# Patient Record
Sex: MALE | Race: WHITE | Employment: FULL TIME | URBAN - METROPOLITAN AREA
[De-identification: names, ages, dates, MRNs, and addresses within clinical notes are randomized per-mention and may not be internally consistent; named-entity substitution may affect disease eponyms.]

---

## 2022-06-06 ENCOUNTER — HOSPITAL ENCOUNTER (EMERGENCY)
Age: 52
Discharge: HOME | End: 2022-06-06
Attending: STUDENT IN AN ORGANIZED HEALTH CARE EDUCATION/TRAINING PROGRAM
Payer: COMMERCIAL

## 2022-06-06 ENCOUNTER — APPOINTMENT (OUTPATIENT)
Dept: GENERAL RADIOLOGY | Age: 52
End: 2022-06-06
Payer: COMMERCIAL

## 2022-06-06 VITALS
DIASTOLIC BLOOD PRESSURE: 98 MMHG | RESPIRATION RATE: 18 BRPM | HEART RATE: 74 BPM | HEIGHT: 70 IN | SYSTOLIC BLOOD PRESSURE: 182 MMHG | BODY MASS INDEX: 27.92 KG/M2 | WEIGHT: 195 LBS | OXYGEN SATURATION: 97 % | TEMPERATURE: 98.2 F

## 2022-06-06 DIAGNOSIS — S62.617A CLOSED DISPLACED FRACTURE OF PROXIMAL PHALANX OF LEFT LITTLE FINGER, INITIAL ENCOUNTER: ICD-10-CM

## 2022-06-06 DIAGNOSIS — S62.619A CLOSED DISPLACED FRACTURE OF PROXIMAL PHALANX OF FINGER OF LEFT HAND: Primary | ICD-10-CM

## 2022-06-06 PROCEDURE — 29125 APPL SHORT ARM SPLINT STATIC: CPT

## 2022-06-06 PROCEDURE — 73130 X-RAY EXAM OF HAND: CPT

## 2022-06-06 PROCEDURE — 99283 EMERGENCY DEPT VISIT LOW MDM: CPT

## 2022-06-06 RX ORDER — ASPIRIN 81 MG/1
81 TABLET, CHEWABLE ORAL DAILY
COMMUNITY

## 2022-06-06 ASSESSMENT — PAIN SCALES - GENERAL: PAINLEVEL_OUTOF10: 2

## 2022-06-06 ASSESSMENT — ENCOUNTER SYMPTOMS
BLOOD IN STOOL: 0
SHORTNESS OF BREATH: 0
SORE THROAT: 0
BACK PAIN: 0
COUGH: 0
CONSTIPATION: 0
ABDOMINAL PAIN: 0
VOMITING: 0
RHINORRHEA: 0
NAUSEA: 0
DIARRHEA: 0

## 2022-06-06 ASSESSMENT — PAIN DESCRIPTION - PAIN TYPE: TYPE: ACUTE PAIN

## 2022-06-06 ASSESSMENT — PAIN DESCRIPTION - DESCRIPTORS: DESCRIPTORS: ACHING;DISCOMFORT;SORE

## 2022-06-06 ASSESSMENT — PAIN - FUNCTIONAL ASSESSMENT: PAIN_FUNCTIONAL_ASSESSMENT: 0-10

## 2022-06-06 ASSESSMENT — PAIN DESCRIPTION - LOCATION: LOCATION: HAND

## 2022-06-06 NOTE — ED PROVIDER NOTES
regular rhythm. Heart sounds: Normal heart sounds. No murmur heard. Pulmonary:      Effort: Pulmonary effort is normal. No respiratory distress. Breath sounds: Normal breath sounds. No stridor. Abdominal:      General: There is no distension. Palpations: Abdomen is soft. Musculoskeletal:         General: Swelling and tenderness present. No deformity. Cervical back: Normal range of motion and neck supple. Comments: Decreased range of motion of fifth digit on left hand. There is swelling and ecchymosis noted. He has good capillary refill, he is neurovascular intact. Skin:     General: Skin is warm and dry. Coloration: Skin is not jaundiced or pale. Findings: No rash. Neurological:      Mental Status: He is alert and oriented to person, place, and time. Cranial Nerves: No cranial nerve deficit. Coordination: Coordination normal.          Procedures     Joint Reduction Procedure Note  Indication: fracture  Consent: The patient provided verbal consent for this procedure. Procedure: The pre-reduction exam showed distal perfusion & neurologic function to be normal. The patient was placed in the appropriate position. Anesthesia/pain control was obtained using a digital block of the left short (pinkie) finger using 1% Lidocaine without epinephrine. Reduction of the left short (pinkie) finger PIP joint was performed by direct traction. Post reduction films were obtained and revealed satisfactory reduction. A post-reduction exam revealed distal perfusion to be normal will good capillary refill. He was still numb secondary to the block. The affected area was immobilized with an ulnar gutter splint. The patient tolerated the procedure well. Complications: None        MDM     ED Course as of 06/09/22 0116   Mon Jun 06, 2022   1210 Spoke with Arnie Bates, orthopedic PA  Discussed findings.   Recommends digital block, followed by reduction and ulnar gutter splint. [BB] ED Course User Index  [BB] DO David Otoole presents to the ED for evaluation of finger injury. Workup in the ED revealed displaced fracture. Digital block performed, reduction performed. Patient with satisfactory reduction and ulnar gutter splint placed. Patient continues to be non-toxic on re-evaluation. Findings were discussed with the patient and reasons to immediately return to the ED were articulated to them. They will follow-up with occupational health.     --------------------------------------------- PAST HISTORY ---------------------------------------------  Past Medical History:  has a past medical history of Hypertension. Past Surgical History:  has no past surgical history on file. Social History:  reports that he has been smoking cigarettes. He has been smoking about 1.00 pack per day. He has never used smokeless tobacco. He reports that he does not drink alcohol and does not use drugs. Family History: family history is not on file. The patients home medications have been reviewed. Allergies: Pcn [penicillins]    -------------------------------------------------- RESULTS -------------------------------------------------  Labs:  No results found for this visit on 06/06/22. Radiology:  XR HAND LEFT (MIN 3 VIEWS)   Final Result   Anatomically aligned fracture of the proximal phalanx of 5th finger. XR HAND LEFT (MIN 3 VIEWS)   Final Result   Acute left 5th proximal phalangeal diaphysis fracture as described.             ------------------------- NURSING NOTES AND VITALS REVIEWED ---------------------------  Date / Time Roomed:  6/6/2022  9:14 AM  ED Bed Assignment:  03/03    The nursing notes within the ED encounter and vital signs as below have been reviewed.    BP (!) 182/98   Pulse 74   Temp 98.2 °F (36.8 °C)   Resp 18   Ht 5' 10\" (1.778 m)   Wt 195 lb (88.5 kg)   SpO2 97%   BMI 27.98 kg/m²   Oxygen Saturation Interpretation: Normal      ------------------------------------------ PROGRESS NOTES ------------------------------------------  1:20 AM EDT  I have spoken with the patient and discussed todays results, in addition to providing specific details for the plan of care and counseling regarding the diagnosis and prognosis. Their questions are answered at this time and they are agreeable with the plan. I discussed at length with them reasons for immediate return here for re evaluation. They will followup with the on call occupational health by calling their office tomorrow. --------------------------------- ADDITIONAL PROVIDER NOTES ---------------------------------  At this time the patient is without objective evidence of an acute process requiring hospitalization or inpatient management. They have remained hemodynamically stable throughout their entire ED visit and are stable for discharge with outpatient follow-up. The plan has been discussed in detail and they are aware of the specific conditions for emergent return, as well as the importance of follow-up. Discharge Medication List as of 6/6/2022  2:02 PM          Diagnosis:  1. Closed displaced fracture of proximal phalanx of finger of left hand    2. Closed displaced fracture of proximal phalanx of left little finger, initial encounter        Disposition:  Patient's disposition: Discharge to home  Patient's condition is stable. Please note that the above documentation was prepared using voice recognition software. Every attempt was made to ensure accuracy but there may be spelling, grammatical, and contextual errors.            Terrell Hand DO  06/09/22 0121

## 2022-06-06 NOTE — ED NOTES
The pt is awake, alert, and oriented. Sitting up right in the chair in the room versus the patient bed. He denies pain at this time. There is no monitor to reassess his vital signs. He states that the ER physician is coming back to reset his finger. He declines the ice pack that I offered him.      Katherine Page, [place]  06/06/22 4286

## 2022-06-06 NOTE — ED NOTES
Received report from [address] [place] [address] and assumed patient care.      [facility]  06/06/22 2767

## 2022-06-07 ENCOUNTER — TELEPHONE (OUTPATIENT)
Dept: ORTHOPEDIC SURGERY | Age: 52
End: 2022-06-07

## 2022-06-07 NOTE — TELEPHONE ENCOUNTER
Patient was seen at [facility] for Closed displaced fracture of proximal phalanx of finger of left hand; Closed displaced fracture of proximal phalanx of left little finger, initial encounter. He was advised to follow up with Dr Betty Holloway. He is asking to be scheduled in [place]. Please contact patient to schedule.

## 2022-06-07 NOTE — TELEPHONE ENCOUNTER
May be a Workers Comp case. ED provider has not completed their note as of 6/7/2022 3:43 PM.    XR Hand Left  Impression   Anatomically aligned fracture of the proximal phalanx of 5th finger. Routed to Providers for consideration and scheduling recommendations.

## 2022-06-08 DIAGNOSIS — T14.8XXA FRACTURE: Primary | ICD-10-CM

## 2022-06-08 NOTE — TELEPHONE ENCOUNTER
Adelfo Sherman next week unless he wants to be seen in [place] [address] next time Bill Donahue is there.

## 2022-06-08 NOTE — TELEPHONE ENCOUNTER
Call back from pt advised cannot see at [facility] office d/t no casting material, scheduled appt  [facility] office.    Future Appointments   Date Time Provider Marybel Roberts   6/14/2022  8:00  [facility]

## 2022-06-14 ENCOUNTER — OFFICE VISIT (OUTPATIENT)
Dept: ORTHOPEDIC SURGERY | Age: 52
End: 2022-06-14
Payer: COMMERCIAL

## 2022-06-14 ENCOUNTER — HOSPITAL ENCOUNTER (OUTPATIENT)
Dept: GENERAL RADIOLOGY | Age: 52
Discharge: HOME | End: 2022-06-16
Payer: COMMERCIAL

## 2022-06-14 ENCOUNTER — PREP FOR PROCEDURE (OUTPATIENT)
Dept: ORTHOPEDIC SURGERY | Age: 52
End: 2022-06-14

## 2022-06-14 ENCOUNTER — TELEPHONE (OUTPATIENT)
Dept: ORTHOPEDIC SURGERY | Age: 52
End: 2022-06-14

## 2022-06-14 VITALS — WEIGHT: 195 LBS | HEIGHT: 70 IN | BODY MASS INDEX: 27.92 KG/M2

## 2022-06-14 DIAGNOSIS — T14.8XXA FRACTURE: ICD-10-CM

## 2022-06-14 DIAGNOSIS — S62.617A DISPLACED FRACTURE OF PROXIMAL PHALANX OF LEFT LITTLE FINGER, INITIAL ENCOUNTER FOR CLOSED FRACTURE: Primary | ICD-10-CM

## 2022-06-14 PROCEDURE — 99202 OFFICE O/P NEW SF 15 MIN: CPT | Performed by: PHYSICIAN ASSISTANT

## 2022-06-14 PROCEDURE — 99203 OFFICE O/P NEW LOW 30 MIN: CPT | Performed by: PHYSICIAN ASSISTANT

## 2022-06-14 PROCEDURE — 99202 OFFICE O/P NEW SF 15 MIN: CPT

## 2022-06-14 PROCEDURE — 73130 X-RAY EXAM OF HAND: CPT

## 2022-06-14 RX ORDER — SODIUM CHLORIDE 0.9 % (FLUSH) 0.9 %
5-40 SYRINGE (ML) INJECTION PRN
Status: CANCELLED | OUTPATIENT
Start: 2022-06-14

## 2022-06-14 RX ORDER — SODIUM CHLORIDE 9 MG/ML
INJECTION, SOLUTION INTRAVENOUS PRN
Status: CANCELLED | OUTPATIENT
Start: 2022-06-14

## 2022-06-14 RX ORDER — SODIUM CHLORIDE 0.9 % (FLUSH) 0.9 %
5-40 SYRINGE (ML) INJECTION EVERY 12 HOURS SCHEDULED
Status: CANCELLED | OUTPATIENT
Start: 2022-06-14

## 2022-06-14 NOTE — H&P (VIEW-ONLY)
Orthopaedic H&P Note    Jonn Read is a 47 y.o. male, his YOB: 1970 with the following history as recorded in [employer]: There are no problems to display for this patient. Current Outpatient Medications   Medication Sig Dispense Refill    aspirin 81 MG chewable tablet Take 81 mg by mouth daily      valsartan-hydrochlorothiazide (DIOVAN-HCT) 320-12.5 MG per tablet Take 1 tablet by mouth daily       No current facility-administered medications for this visit. Allergies: Pcn [penicillins]  Past Medical History:   Diagnosis Date    Hypertension      No past surgical history on file. No family history on file. Social History     Tobacco Use    Smoking status: Current Every Day Smoker     Packs/day: 1.00     Types: Cigarettes    Smokeless tobacco: Never Used   Substance Use Topics    Alcohol use: No                             Chief Complaint   Patient presents with    ED Follow-up     LT 5th finger fx, due to work injury. Patient presents w/o splint that was placed in ED. NO complaints of pain at this time. SUBJECTIVE: Jonn Read is a 67-year-old left-hand-dominant male who is an ED follow-up for a left hand injury. He states that on 6/6/2022 he injured his left little finger at work when he cranked up the engine and the wheel started getting loose, he reached out to grab the handle and hit the outside of his fifth digit of his left hand. He noticed immediate pain and deformity of the pinky finger. X-rays in the ED showed a transverse fracture through the left fifth proximal phalangeal diaphysis with anterior and lateral angulation of the fracture. The fracture was reduced in the ED with postreduction films showing improved alignment of the fracture. He was placed into a splint and sent here for follow-up. He states he is really not having any pain and the swelling has decreased since the initial injury. Denies numbness, tingling or paresthesias. He is a smoker.   No other orthopedic complaints at this time. Review of Systems   Constitutional: Negative for fever, chills, diaphoresis, appetite change and fatigue. HENT: Negative for dental issues, hearing loss and tinnitus. Negative for congestion, sinus pressure, sneezing, sore throat. Negative for headache. Eyes: Negative for visual disturbance, blurred and double vision. Negative for pain, discharge, redness and itching  Respiratory: Negative for cough, shortness of breath and wheezing. Cardiovascular: Negative for chest pain, palpitations and leg swelling. No dyspnea on exertion   Gastrointestinal:   Negative for nausea, vomiting, abdominal pain, diarrhea, constipation  or black or bloody. Hematologic\Lymphatic:  negative for bleeding, petechiae,   Genitourinary: Negative for hematuria and difficulty urinating. Musculoskeletal: Negative for neck pain and stiffness. Mild for back pain, negative joint swelling and gait problem. Skin: Negative for pallor, rash and wound. Neurological: Negative for dizziness, tremors, seizures, weakness, light-headedness, no TIA or stroke symptoms. No numbness and headaches. Psychiatric/Behavioral: Negative.      Physical Examination:   General appearance: alert, well appearing, and in no distress,  normal appearing weight  Mental status: alert, oriented to person, place, and time, normal mood, behavior, speech, dress, motor activity, and thought processes  Abdomen: soft, nondistended   Resp:   resp easy and unlabored, no audible wheezes note  Cardiac: distal pulses palpable, skin well perfused  Neurological: alert, oriented X3, normal speech, no focal findings or movement disorder noted, motor and sensory grossly normal bilaterally, normal muscle tone, no tremors, strength 5/5, normal gait and station  HEENT: normochephalic atraumatic, external ears and eyes normal, sclera normal, neck supple  Extremities:   peripheral pulses normal, no edema, redness or tenderness in the calves Skin: normal coloration, no rashes or open wounds, no suspicious skin lesions noted  Psych: Affect euthymic   Musculoskeletal:    Extremity:  Left Upper Extremity  Skin is clean dry and intact  Moderate edema noted in the little finger  Radial pulse palpable, fingers warm with BCR  Flex/extension intact to wrist, thumb and fingers  Finger opposition intact  Finger adduction/abduction intact  Finger crossover intact  Subjectively states sensation intact to radial/medial/ulnar distribution  He has limited motion with flexion/extension of the pinky finger at the MP joint. He cannot flex/extend the pinky finger at the PIP and DIP joints due to stiffness and swelling. There is some rotational deformity at the little finger when he tries to make a fist.  He wiggles all of his other fingers without difficulty. Ht 5' 10\" (1.778 m)   Wt 195 lb (88.5 kg)   BMI 27.98 kg/m²      XR: 6/14/22   3 views left hand demonstrate transverse fracture through the left fifth proximal phalangeal diaphysis with anterior and lateral angulation of the fracture apex. ASSESSMENT:   Diagnosis Orders   1. Displaced fracture of proximal phalanx of left little finger, initial encounter for closed fracture       Discussion:  Had lengthy discussion with patient regarding His diagnosis, typical prognosis, and expected outcomes. I reviewed the possible complications from the injury itself despite treatment choosen. I also discussed treatment options including nonoperative managements versus surgical management, along with risks and benefits of each. They have elected for operative management at this time. Discussed with patient factors that can impact patient's fracture healing. Patient has the following risk factors for union: Nicotine Use    Risk, benefits and treatment options discussed with David Self.  he has verbalized understanding of options.  The possibility of complications were also discussed to include but not limited to nerve damage, infection, problems with wound healing, vascular injury, chronic pain, stiffness, dysfunction, nonhealing of the bone, symptomatic hardware and/or its failure, need for subsequent surgery, dislocation, and blood clots as well as medical related problems and other problems not specifically discussed. Risk of anesthesia also discussed to include death. Post-op care, work, activity and restrictions which included the use of pain medication and possibility of using blood thinner post op were also discussed with Leroy Taylor and he verbalized and agreed with the restrictions. PLAN:  Plan for OR on 6/17/22 with Dr. Phoenix Pierre, DO for closed reduction percutaneous pinning versus ORIF left fifth proximal phalanx fracture  Pre-surgery medical clearance is not needed  NPO after midnight the night before surgery  NWB on left upper extremity  Splint care instructed with return precautions  Hold NSAIDS 7 days prior to surgery  Hold aspirin the morning of your surgery    [facility] Patient Plan Of Care  New Orders Needing C-9 Authorization: Closed reduction percutaneous pinning versus ORIF left fifth proximal phalanx fracture  [employer]/Work Status: Patient not released to return to work at this time and will be reevaluated at next office visit  Patient Progressing: Progressing as expected  Patient candidate for Vocational Rehab at this time: No  Patient determined to be [address] [place] at this visit: No    Electronically signed by RAQUEL Cleaning on 6/14/2022 at 8:30 AM  Note: This report was completed using [employer] voiced recognition software. Every effort has been made to ensure accuracy; however, inadvertent computerized transcription errors may be present.

## 2022-06-14 NOTE — TELEPHONE ENCOUNTER
Contacted patient to advise on new surgery date. Original surgery date was 6- but Dr. Michelle Serrano changed surgery date to 6- @ 8:30 am , hospital arrival time of 6:30 am.    Patient did not answer, spoke to his wife Sakshi Tapia (home phone number belongs to her because patient does not have a cell phone). Advised wife of surgery date and time ( was not there). [facility] PAT Department phone number provided to call to go over surgery instructions.      Per wife, she prefers to be contacted after 2 to 3 pm.   Phone # [phone]

## 2022-06-14 NOTE — TELEPHONE ENCOUNTER
Prior Authorization Form:    DEMOGRAPHICS:                     Patient Name:  Nic Jauregui  Patient :  1970            Insurance:  Payor: GENERIC SELF-INSURED / Plan: Ramona Ureña WC 1500 / Product Type: *No Product type* /   Insurance ID Number:    Payor/Plan Subscr  Sex Relation Sub.  Ins. ID Effective Group Num   1. GENERIC SELF-* EDUAR YANCEY 1970 Male Self [id number]                                    [facility], [facility], [address] [place] [address], [address] [place] [address]       [] Prior authorization obtained    DIAGNOSIS & PROCEDURE:                       Procedure/Operation: Closed Reduction Percutaneous Pinning verus ORIF, Left Hand 5th Digit Proximal Phalanx           CPT Code: 40223 verus 16016    Diagnosis:  Closed Displaced Fracture of Proximal Phalanx of Left Hand 5th Digit    ICD10 Code: L57.154X    Location:  [facility]    Surgeon:  Dr. Winifred Uriarte    [facility] INFORMATION:                          Date: 2022   Time: 8:30 am               Anesthesia:  General                                                        Status:  Outpatient        Special Comments:       Vendor: [employer] [x]   Notified     Length of Surgery (with 30 min clean up time): 1.5 hours     Medical clearance: not requested    Pre-Op Labs:       []  Orders Placed    Electronically signed by Michelle Pimentel MA on 2022 at 11:43 AM

## 2022-06-14 NOTE — PROGRESS NOTES
Orthopaedic H&P Note    Jonn Read is a 47 y.o. male, his YOB: 1970 with the following history as recorded in [facility]: There are no problems to display for this patient. Current Outpatient Medications   Medication Sig Dispense Refill    aspirin 81 MG chewable tablet Take 81 mg by mouth daily      valsartan-hydrochlorothiazide (DIOVAN-HCT) 320-12.5 MG per tablet Take 1 tablet by mouth daily       No current facility-administered medications for this visit. Allergies: Pcn [penicillins]  Past Medical History:   Diagnosis Date    Hypertension      No past surgical history on file. No family history on file. Social History     Tobacco Use    Smoking status: Current Every Day Smoker     Packs/day: 1.00     Types: Cigarettes    Smokeless tobacco: Never Used   Substance Use Topics    Alcohol use: No                             Chief Complaint   Patient presents with    ED Follow-up     LT 5th finger fx, due to work injury. Patient presents w/o splint that was placed in ED. NO complaints of pain at this time. SUBJECTIVE: Jonn Read is a 58-year-old left-hand-dominant male who is an ED follow-up for a left hand injury. He states that on 6/6/2022 he injured his left little finger at work when he cranked up the engine and the wheel started getting loose, he reached out to grab the handle and hit the outside of his fifth digit of his left hand. He noticed immediate pain and deformity of the pinky finger. X-rays in the ED showed a transverse fracture through the left fifth proximal phalangeal diaphysis with anterior and lateral angulation of the fracture. The fracture was reduced in the ED with postreduction films showing improved alignment of the fracture. He was placed into a splint and sent here for follow-up. He states he is really not having any pain and the swelling has decreased since the initial injury. Denies numbness, tingling or paresthesias. He is a smoker.   No other orthopedic complaints at this time. Review of Systems   Constitutional: Negative for fever, chills, diaphoresis, appetite change and fatigue. HENT: Negative for dental issues, hearing loss and tinnitus. Negative for congestion, sinus pressure, sneezing, sore throat. Negative for headache. Eyes: Negative for visual disturbance, blurred and double vision. Negative for pain, discharge, redness and itching  Respiratory: Negative for cough, shortness of breath and wheezing. Cardiovascular: Negative for chest pain, palpitations and leg swelling. No dyspnea on exertion   Gastrointestinal:   Negative for nausea, vomiting, abdominal pain, diarrhea, constipation  or black or bloody. Hematologic\Lymphatic:  negative for bleeding, petechiae,   Genitourinary: Negative for hematuria and difficulty urinating. Musculoskeletal: Negative for neck pain and stiffness. Mild for back pain, negative joint swelling and gait problem. Skin: Negative for pallor, rash and wound. Neurological: Negative for dizziness, tremors, seizures, weakness, light-headedness, no TIA or stroke symptoms. No numbness and headaches. Psychiatric/Behavioral: Negative.      Physical Examination:   General appearance: alert, well appearing, and in no distress,  normal appearing weight  Mental status: alert, oriented to person, place, and time, normal mood, behavior, speech, dress, motor activity, and thought processes  Abdomen: soft, nondistended   Resp:   resp easy and unlabored, no audible wheezes note  Cardiac: distal pulses palpable, skin well perfused  Neurological: alert, oriented X3, normal speech, no focal findings or movement disorder noted, motor and sensory grossly normal bilaterally, normal muscle tone, no tremors, strength 5/5, normal gait and station  HEENT: normochephalic atraumatic, external ears and eyes normal, sclera normal, neck supple  Extremities:   peripheral pulses normal, no edema, redness or tenderness in the calves Skin: normal coloration, no rashes or open wounds, no suspicious skin lesions noted  Psych: Affect euthymic   Musculoskeletal:    Extremity:  Left Upper Extremity  Skin is clean dry and intact  Moderate edema noted in the little finger  Radial pulse palpable, fingers warm with BCR  Flex/extension intact to wrist, thumb and fingers  Finger opposition intact  Finger adduction/abduction intact  Finger crossover intact  Subjectively states sensation intact to radial/medial/ulnar distribution  He has limited motion with flexion/extension of the pinky finger at the MP joint. He cannot flex/extend the pinky finger at the PIP and DIP joints due to stiffness and swelling. There is some rotational deformity at the little finger when he tries to make a fist.  He wiggles all of his other fingers without difficulty. Ht 5' 10\" (1.778 m)   Wt 195 lb (88.5 kg)   BMI 27.98 kg/m²      XR: 6/14/22   3 views left hand demonstrate transverse fracture through the left fifth proximal phalangeal diaphysis with anterior and lateral angulation of the fracture apex. ASSESSMENT:   Diagnosis Orders   1. Displaced fracture of proximal phalanx of left little finger, initial encounter for closed fracture       Discussion:  Had lengthy discussion with patient regarding His diagnosis, typical prognosis, and expected outcomes. I reviewed the possible complications from the injury itself despite treatment choosen. I also discussed treatment options including nonoperative managements versus surgical management, along with risks and benefits of each. They have elected for operative management at this time. Discussed with patient factors that can impact patient's fracture healing. Patient has the following risk factors for union: Nicotine Use    Risk, benefits and treatment options discussed with Sindhu Sousa.  he has verbalized understanding of options.  The possibility of complications were also discussed to include but not limited to nerve damage, infection, problems with wound healing, vascular injury, chronic pain, stiffness, dysfunction, nonhealing of the bone, symptomatic hardware and/or its failure, need for subsequent surgery, dislocation, and blood clots as well as medical related problems and other problems not specifically discussed. Risk of anesthesia also discussed to include death. Post-op care, work, activity and restrictions which included the use of pain medication and possibility of using blood thinner post op were also discussed with Lynae Holstein and he verbalized and agreed with the restrictions. PLAN:  Plan for OR on 6/17/22 with Dr. Yonathan Dos Santos, DO for closed reduction percutaneous pinning versus ORIF left fifth proximal phalanx fracture  Pre-surgery medical clearance is not needed  NPO after midnight the night before surgery  NWB on left upper extremity  Splint care instructed with return precautions  Hold NSAIDS 7 days prior to surgery  Hold aspirin the morning of your surgery    [facility] Patient Plan Of Care  New Orders Needing C-9 Authorization: Closed reduction percutaneous pinning versus ORIF left fifth proximal phalanx fracture  [employer]/Work Status: Patient not released to return to work at this time and will be reevaluated at next office visit  Patient Progressing: Progressing as expected  Patient candidate for Vocational Rehab at this time: No  Patient determined to be [address] at this visit: No    Electronically signed by RAQUEL Menjivar on 6/14/2022 at 8:30 AM  Note: This report was completed using [employer] voiced recognition software. Every effort has been made to ensure accuracy; however, inadvertent computerized transcription errors may be present.

## 2022-06-14 NOTE — PATIENT INSTRUCTIONS
1. Your surgery is scheduled for 6/17/22 at patient will be contacted with surgical time. With Dr. Sejal Jenkins, DO at the main [id number] on On [address] of [facility] in [facility] . You will need to report to Preop area  that morning at patient will be contacted regarding what time to arrive for surgery. 2. You are having Outpatient surgery so you will be returning home the same day  3. Preadmission Testing (PAT) department at [facility] will contact you with all the details prior to surgery. 4. Nothing to eat or drink after midnight the night before surgery. You may take a pain pill and any other medicine PAT instructs you to take with small sip of water if needed. 5. Keep splint clean and dry. Do not remove or get wet. 6. Continue with ice and elevation to reduce swelling  7. No weight bearing left upper extremity, use assistive devices  8. Take pain medicine as instructed  9. Call office with any question or concerns: [phone]. Hold ASA/LOVENOX the day of surgery. Hold all NSAIDs 7 days prior to surgery    All surgical patients will be gradually tapered off narcotic pain medication post operatively, this office will not continue narcotics longer than 6 weeks from date of surgery. If narcotics are required longer than this time period without objective finding you will be referred to pain management. Open Reduction With Internal Fixation for Limb Fracture: Before Your Surgery    What is open reduction with internal fixation? Open reduction with internal fixation is a type of surgery to fix a broken (fractured) bone. The doctor makes a cut, called an incision, in the skin over the bone. The doctor then moves the pieces of bone back into the normal position. This is called open reduction. The doctor may use special screws, pins, plates, or rods to hold the bone in place while it heals. This is called internal fixation. These devices may stay in your body from now on.  The doctor closes the incision with stitches. You will have a scar, but it will fade with time. You may spend from a few hours to a few days in the hospital. This depends on how serious your injury is. It usually takes 6 to 12 weeks for a broken bone to heal.    How soon you can go back to work and your normal routine depends on your job. It also depends on how long it takes your bone to heal. For example, if you have a broken leg and you sit at work, you may be able to go back in 1 to 2 weeks. But if your job requires you to walk or stand a lot, you may need to wait until your bone has healed.

## 2022-06-15 NOTE — PROGRESS NOTES
[address]   PRE-ADMISSION TESTING GENERAL INSTRUCTIONS  PAT Phone Number: [phone]      GENERAL INSTRUCTIONS:    [x] Antibacterial Soap Shower Night before or AM of Surgery  [] Hua (CHG) wipe instruction sheet and wipes given. [] Hibiclens shower the night before and the morning of surgery.   -Do not use Hibiclens on your face or head. [x] No lotions, powders, deodorant. [x] Nothing by mouth after midnight, including gum, candy, mints, or water. [x] You may brush your teeth, gargle, but do NOT swallow water. [x] No tobacco products, illegal drugs, or alcohol within 24 hours of your surgery. [x] Jewelry or valuables should not be brought to the hospital. All body and/or tongue piercing's must be removed prior to arriving to hospital.   [x] Arrange transportation with a responsible adult  to and from the hospital. Arrange for someone to be with you for the remainder of the day and for 24 hours after your procedure due to having had anesthesia when discharged          -Who will be your  for transportation? Alicia-wife         -Who will be staying with you for 24 hrs after your procedure? Alicia-wife  [x] Bring insurance card and photo ID.  [] Bring copy of living will or healthcare power of  papers to be placed in your electronic record. [] Transfusion Bracelet: Please bring with you to hospital, day of surgery     PARKING INSTRUCTIONS:     [x] ARRIVAL TIME:  6/17 at 7:17 am   · [x] Enter into the [facility]. Two people may accompany you. Masks are required. · [x] Parking Lot \"I\" is where you will park. It is located on the corner of [address] and [address]. The entrance is on [address]. · To enter, press the button and the gate will lift. A free token will be provided to exit the lot. EDUCATION INSTRUCTIONS:        [] Knee or hip replacement booklet & exercise pamphlets given.      [x] [address] Pamphlet placed in chart. [] Pre-admission Testing educational folder given  [] Incentive Spirometry,coughing & deep breathing exercises reviewed. [] Medication information sheet(s)   [] Fluoroscopy-Xray used in surgery reviewed with patient. Educational pamphlet placed in chart. [] Pain: Post-op pain is normal and to be expected. You will be asked to rate your pain from 0-10. [] Joint camp offered. [] Joint replacement booklets given.  [] Spine Navigator to see in PAT. [] Other:___________________________    MEDICATION INSTRUCTIONS:    [x] Bring a complete list of your medications, please write the last time you took the medicine, give this list to the nurse. [x] Take the following medications the morning of surgery with 1-2 ounces of water:  Tylenol if needed   [x] Stop herbal supplements, NSAIDS and vitamins 5 days before your surgery. [] DO NOT take any diabetic medicine the morning of surgery. Follow instructions for insulin the day before surgery. [] If you are diabetic and your blood sugar is low or you feel symptomatic, you may drink 1-2 ounces of apple juice or take a glucose tablet.            -The morning of your procedure, you may call the pre-op area if you have concerns about your blood sugar [phone]. [] Use your inhalers the morning of surgery. Bring your emergency inhaler with you day of surgery. [x] Follow physician instructions regarding any blood thinners you may be taking. WHAT TO EXPECT:    [x] The day of surgery you will be greeted and checked in by the [employer].  In addition, you will be registered in the [place] by a Patient Access Representative. Please bring your photo ID and insurance card. A nurse will greet you in accordance to the time you are needed in the pre-op area to prepare you for surgery.  Please do not be discouraged if you are not greeted in the order you arrive as there are many variables that are involved in patient preparation. Your patience is greatly appreciated as you wait for your nurse. Please bring in items such as: books, magazines, newspapers, electronics, or any other items  to occupy your time in the waiting area. [x]  Delays may occur with surgery and staff will make a sincere effort to keep you informed of delays. If any delays occur with your procedure, we apologize ahead of time for your inconvenience as we recognize the value of your time.

## 2022-06-16 ENCOUNTER — ANESTHESIA EVENT (OUTPATIENT)
Dept: OPERATING ROOM | Age: 52
End: 2022-06-16
Payer: COMMERCIAL

## 2022-06-17 ENCOUNTER — ANESTHESIA (OUTPATIENT)
Dept: OPERATING ROOM | Age: 52
End: 2022-06-17
Payer: COMMERCIAL

## 2022-06-17 ENCOUNTER — APPOINTMENT (OUTPATIENT)
Dept: GENERAL RADIOLOGY | Age: 52
End: 2022-06-17
Attending: ORTHOPAEDIC SURGERY
Payer: COMMERCIAL

## 2022-06-17 ENCOUNTER — HOSPITAL ENCOUNTER (OUTPATIENT)
Age: 52
Setting detail: OUTPATIENT SURGERY
Discharge: HOME | End: 2022-06-17
Attending: ORTHOPAEDIC SURGERY | Admitting: ORTHOPAEDIC SURGERY
Payer: COMMERCIAL

## 2022-06-17 VITALS
HEIGHT: 70 IN | HEART RATE: 70 BPM | RESPIRATION RATE: 18 BRPM | DIASTOLIC BLOOD PRESSURE: 92 MMHG | WEIGHT: 195 LBS | OXYGEN SATURATION: 97 % | SYSTOLIC BLOOD PRESSURE: 158 MMHG | TEMPERATURE: 97 F | BODY MASS INDEX: 27.92 KG/M2

## 2022-06-17 DIAGNOSIS — M79.645 FINGER PAIN, LEFT: ICD-10-CM

## 2022-06-17 DIAGNOSIS — S62.617A DISPLACED FRACTURE OF PROXIMAL PHALANX OF LEFT LITTLE FINGER, INITIAL ENCOUNTER FOR CLOSED FRACTURE: Primary | ICD-10-CM

## 2022-06-17 PROCEDURE — 26727 TREAT FINGER FRACTURE EACH: CPT | Performed by: ORTHOPAEDIC SURGERY

## 2022-06-17 PROCEDURE — 3600000002 HC SURGERY LEVEL 2 BASE: Performed by: ORTHOPAEDIC SURGERY

## 2022-06-17 PROCEDURE — 7100000010 HC PHASE II RECOVERY - FIRST 15 MIN: Performed by: ORTHOPAEDIC SURGERY

## 2022-06-17 PROCEDURE — 3700000001 HC ADD 15 MINUTES (ANESTHESIA): Performed by: ORTHOPAEDIC SURGERY

## 2022-06-17 PROCEDURE — 6360000002 HC RX W HCPCS

## 2022-06-17 PROCEDURE — 7100000001 HC PACU RECOVERY - ADDTL 15 MIN: Performed by: ORTHOPAEDIC SURGERY

## 2022-06-17 PROCEDURE — 6360000002 HC RX W HCPCS: Performed by: PHYSICIAN ASSISTANT

## 2022-06-17 PROCEDURE — 3209999900 FLUORO FOR SURGICAL PROCEDURES

## 2022-06-17 PROCEDURE — 3700000000 HC ANESTHESIA ATTENDED CARE: Performed by: ORTHOPAEDIC SURGERY

## 2022-06-17 PROCEDURE — 6360000002 HC RX W HCPCS: Performed by: ANESTHESIOLOGY

## 2022-06-17 PROCEDURE — 2709999900 HC NON-CHARGEABLE SUPPLY: Performed by: ORTHOPAEDIC SURGERY

## 2022-06-17 PROCEDURE — 2580000003 HC RX 258

## 2022-06-17 PROCEDURE — 3600000012 HC SURGERY LEVEL 2 ADDTL 15MIN: Performed by: ORTHOPAEDIC SURGERY

## 2022-06-17 PROCEDURE — 7100000011 HC PHASE II RECOVERY - ADDTL 15 MIN: Performed by: ORTHOPAEDIC SURGERY

## 2022-06-17 PROCEDURE — 2500000003 HC RX 250 WO HCPCS: Performed by: ORTHOPAEDIC SURGERY

## 2022-06-17 PROCEDURE — 7100000000 HC PACU RECOVERY - FIRST 15 MIN: Performed by: ORTHOPAEDIC SURGERY

## 2022-06-17 PROCEDURE — 73120 X-RAY EXAM OF HAND: CPT

## 2022-06-17 PROCEDURE — 2580000003 HC RX 258: Performed by: PHYSICIAN ASSISTANT

## 2022-06-17 DEVICE — K WIRE FIX L6IN DIA0.045IN 1600645] MICROAIRE SURGICAL INSTRUMENTS INC]: Type: IMPLANTABLE DEVICE | Site: FINGERS | Status: FUNCTIONAL

## 2022-06-17 RX ORDER — MIDAZOLAM HYDROCHLORIDE 1 MG/ML
INJECTION INTRAMUSCULAR; INTRAVENOUS PRN
Status: DISCONTINUED | OUTPATIENT
Start: 2022-06-17 | End: 2022-06-17 | Stop reason: SDUPTHER

## 2022-06-17 RX ORDER — SODIUM CHLORIDE 0.9 % (FLUSH) 0.9 %
5-40 SYRINGE (ML) INJECTION PRN
Status: DISCONTINUED | OUTPATIENT
Start: 2022-06-17 | End: 2022-06-17 | Stop reason: HOSPADM

## 2022-06-17 RX ORDER — SODIUM CHLORIDE 9 MG/ML
INJECTION, SOLUTION INTRAVENOUS PRN
Status: DISCONTINUED | OUTPATIENT
Start: 2022-06-17 | End: 2022-06-17 | Stop reason: HOSPADM

## 2022-06-17 RX ORDER — HYDROCODONE BITARTRATE AND ACETAMINOPHEN 5; 325 MG/1; MG/1
1 TABLET ORAL EVERY 4 HOURS PRN
Qty: 42 TABLET | Refills: 0 | Status: SHIPPED | OUTPATIENT
Start: 2022-06-17 | End: 2022-06-24

## 2022-06-17 RX ORDER — HYDRALAZINE HYDROCHLORIDE 20 MG/ML
5 INJECTION INTRAMUSCULAR; INTRAVENOUS ONCE
Status: DISCONTINUED | OUTPATIENT
Start: 2022-06-17 | End: 2022-06-17 | Stop reason: CLARIF

## 2022-06-17 RX ORDER — ONDANSETRON 2 MG/ML
4 INJECTION INTRAMUSCULAR; INTRAVENOUS
Status: DISCONTINUED | OUTPATIENT
Start: 2022-06-17 | End: 2022-06-17 | Stop reason: HOSPADM

## 2022-06-17 RX ORDER — LIDOCAINE HYDROCHLORIDE 20 MG/ML
INJECTION, SOLUTION INTRAVENOUS PRN
Status: DISCONTINUED | OUTPATIENT
Start: 2022-06-17 | End: 2022-06-17 | Stop reason: SDUPTHER

## 2022-06-17 RX ORDER — DEXAMETHASONE SODIUM PHOSPHATE 10 MG/ML
INJECTION INTRAMUSCULAR; INTRAVENOUS PRN
Status: DISCONTINUED | OUTPATIENT
Start: 2022-06-17 | End: 2022-06-17 | Stop reason: SDUPTHER

## 2022-06-17 RX ORDER — SODIUM CHLORIDE 0.9 % (FLUSH) 0.9 %
5-40 SYRINGE (ML) INJECTION EVERY 12 HOURS SCHEDULED
Status: DISCONTINUED | OUTPATIENT
Start: 2022-06-17 | End: 2022-06-17 | Stop reason: HOSPADM

## 2022-06-17 RX ORDER — HYDRALAZINE HYDROCHLORIDE 20 MG/ML
5 INJECTION INTRAMUSCULAR; INTRAVENOUS ONCE
Status: COMPLETED | OUTPATIENT
Start: 2022-06-17 | End: 2022-06-17

## 2022-06-17 RX ORDER — KETOROLAC TROMETHAMINE 30 MG/ML
INJECTION, SOLUTION INTRAMUSCULAR; INTRAVENOUS PRN
Status: DISCONTINUED | OUTPATIENT
Start: 2022-06-17 | End: 2022-06-17 | Stop reason: SDUPTHER

## 2022-06-17 RX ORDER — VANCOMYCIN HYDROCHLORIDE 1 G/20ML
INJECTION, POWDER, LYOPHILIZED, FOR SOLUTION INTRAVENOUS PRN
Status: DISCONTINUED | OUTPATIENT
Start: 2022-06-17 | End: 2022-06-17 | Stop reason: SDUPTHER

## 2022-06-17 RX ORDER — FENTANYL CITRATE 50 UG/ML
INJECTION, SOLUTION INTRAMUSCULAR; INTRAVENOUS PRN
Status: DISCONTINUED | OUTPATIENT
Start: 2022-06-17 | End: 2022-06-17 | Stop reason: SDUPTHER

## 2022-06-17 RX ORDER — BUPIVACAINE HYDROCHLORIDE 5 MG/ML
INJECTION, SOLUTION EPIDURAL; INTRACAUDAL PRN
Status: DISCONTINUED | OUTPATIENT
Start: 2022-06-17 | End: 2022-06-17 | Stop reason: HOSPADM

## 2022-06-17 RX ORDER — PROPOFOL 10 MG/ML
INJECTION, EMULSION INTRAVENOUS PRN
Status: DISCONTINUED | OUTPATIENT
Start: 2022-06-17 | End: 2022-06-17 | Stop reason: SDUPTHER

## 2022-06-17 RX ORDER — ONDANSETRON 2 MG/ML
INJECTION INTRAMUSCULAR; INTRAVENOUS PRN
Status: DISCONTINUED | OUTPATIENT
Start: 2022-06-17 | End: 2022-06-17 | Stop reason: SDUPTHER

## 2022-06-17 RX ORDER — SODIUM CHLORIDE, SODIUM LACTATE, POTASSIUM CHLORIDE, CALCIUM CHLORIDE 600; 310; 30; 20 MG/100ML; MG/100ML; MG/100ML; MG/100ML
INJECTION, SOLUTION INTRAVENOUS CONTINUOUS PRN
Status: DISCONTINUED | OUTPATIENT
Start: 2022-06-17 | End: 2022-06-17 | Stop reason: SDUPTHER

## 2022-06-17 RX ADMIN — SODIUM CHLORIDE, POTASSIUM CHLORIDE, SODIUM LACTATE AND CALCIUM CHLORIDE: 600; 310; 30; 20 INJECTION, SOLUTION INTRAVENOUS at 09:23

## 2022-06-17 RX ADMIN — ONDANSETRON 4 MG: 2 INJECTION INTRAMUSCULAR; INTRAVENOUS at 09:29

## 2022-06-17 RX ADMIN — MIDAZOLAM 2 MG: 1 INJECTION INTRAMUSCULAR; INTRAVENOUS at 09:26

## 2022-06-17 RX ADMIN — SODIUM CHLORIDE: 9 INJECTION, SOLUTION INTRAVENOUS at 07:53

## 2022-06-17 RX ADMIN — FENTANYL CITRATE 100 MCG: 50 INJECTION, SOLUTION INTRAMUSCULAR; INTRAVENOUS at 09:29

## 2022-06-17 RX ADMIN — DEXAMETHASONE SODIUM PHOSPHATE 10 MG: 10 INJECTION INTRAMUSCULAR; INTRAVENOUS at 09:29

## 2022-06-17 RX ADMIN — PROPOFOL 200 MG: 10 INJECTION, EMULSION INTRAVENOUS at 09:29

## 2022-06-17 RX ADMIN — KETOROLAC TROMETHAMINE 30 MG: 60 INJECTION, SOLUTION INTRAMUSCULAR at 09:54

## 2022-06-17 RX ADMIN — VANCOMYCIN HYDROCHLORIDE 1500 MG: 10 INJECTION, POWDER, LYOPHILIZED, FOR SOLUTION INTRAVENOUS at 08:21

## 2022-06-17 RX ADMIN — HYDROMORPHONE HYDROCHLORIDE 0.5 MG: 1 INJECTION, SOLUTION INTRAMUSCULAR; INTRAVENOUS; SUBCUTANEOUS at 10:36

## 2022-06-17 RX ADMIN — LIDOCAINE HYDROCHLORIDE 100 MG: 20 INJECTION, SOLUTION INTRAVENOUS at 09:29

## 2022-06-17 RX ADMIN — HYDROMORPHONE HYDROCHLORIDE 0.5 MG: 1 INJECTION, SOLUTION INTRAMUSCULAR; INTRAVENOUS; SUBCUTANEOUS at 10:46

## 2022-06-17 RX ADMIN — HYDRALAZINE HYDROCHLORIDE 5 MG: 20 INJECTION INTRAMUSCULAR; INTRAVENOUS at 11:42

## 2022-06-17 RX ADMIN — VANCOMYCIN HYDROCHLORIDE 1500 MG: 1 INJECTION, POWDER, LYOPHILIZED, FOR SOLUTION INTRAVENOUS at 09:28

## 2022-06-17 ASSESSMENT — PAIN DESCRIPTION - ORIENTATION
ORIENTATION: LEFT

## 2022-06-17 ASSESSMENT — PAIN DESCRIPTION - DIRECTION
RADIATING_TOWARDS: FINGER
RADIATING_TOWARDS: FINGER
RADIATING_TOWARDS: FINGERS

## 2022-06-17 ASSESSMENT — PAIN DESCRIPTION - FREQUENCY
FREQUENCY: CONTINUOUS
FREQUENCY: INTERMITTENT

## 2022-06-17 ASSESSMENT — PAIN DESCRIPTION - DESCRIPTORS
DESCRIPTORS: DISCOMFORT

## 2022-06-17 ASSESSMENT — PAIN SCALES - GENERAL
PAINLEVEL_OUTOF10: 7
PAINLEVEL_OUTOF10: 0
PAINLEVEL_OUTOF10: 5
PAINLEVEL_OUTOF10: 7
PAINLEVEL_OUTOF10: 5
PAINLEVEL_OUTOF10: 9

## 2022-06-17 ASSESSMENT — PAIN - FUNCTIONAL ASSESSMENT
PAIN_FUNCTIONAL_ASSESSMENT: NONE - DENIES PAIN
PAIN_FUNCTIONAL_ASSESSMENT: ACTIVITIES ARE NOT PREVENTED

## 2022-06-17 ASSESSMENT — PAIN DESCRIPTION - PAIN TYPE
TYPE: SURGICAL PAIN

## 2022-06-17 ASSESSMENT — LIFESTYLE VARIABLES: SMOKING_STATUS: 1

## 2022-06-17 ASSESSMENT — PAIN DESCRIPTION - LOCATION
LOCATION: HAND

## 2022-06-17 ASSESSMENT — PAIN DESCRIPTION - ONSET
ONSET: PROGRESSIVE
ONSET: PROGRESSIVE
ONSET: ON-GOING
ONSET: ON-GOING

## 2022-06-17 NOTE — INTERVAL H&P NOTE
Update History & Physical    The patient's History and Physical of June 14, 2022 was reviewed with the patient and I examined the patient. There was no change. The surgical site was confirmed by the patient and me. Plan:   Left small finger proximal phalanx fracture open versus closed reduction with internal fixation    I have explained the risks and complications of the recommended surgery with the patient at length, as well as discussed potential treatment alternatives including nonoperative management. These risks include but are not limited to death or complication from anesthesia, continued pain, nerve tendon or vascular injury, infection, nonunion or malunion, symptomatic hardware or hardware failure, deep vein thrombosis or pulmonary embolism, stiffness, malrotation, unforeseen complications, and need for further surgery, etc.  Patient understood this, asked appropriate questions, which were all answered, and he has elected to proceed with the procedure.      Electronically signed by Dora Dominguez DO on 6/17/2022 at 7:53 AM

## 2022-06-17 NOTE — PROGRESS NOTES
Dr. Arcenio Goldman notified for Dr. Azucena Wick no x rays post op have been ordered. Also asking  When to resume ASA that patient takes at home.  Mandy Morrell RN  6/17/2022

## 2022-06-17 NOTE — ANESTHESIA POSTPROCEDURE EVALUATION
Department of Anesthesiology  Postprocedure Note    Patient: Jonathan Carias  MRN: [id number]  YOB: 1970  Date of evaluation: 6/17/2022  Time:  4:45 PM     Procedure Summary     Date: 06/17/22 Room / Location: [facility] 09 / [facility]    Anesthesia Start: 9206 Anesthesia Stop: 1030    Procedure: PERCUTANOUS PINNING LEFT HAND 5TH PROXIMAL PHALANX (Left Hand) Diagnosis:       Closed displaced fracture of proximal phalanx of left little finger, initial encounter      (CLOSED DISPLACED FRACTURE OF PROXIMAL PHALANX OF LEFT 5TH DIGIT)    Surgeons: Joshua Ramirez DO Responsible Provider: August Blevins DO    Anesthesia Type: general ASA Status: 2          Anesthesia Type: No value filed. Yola Phase I: Oyla Score: 10    Yola Phase II: Yola Score: 10    Last vitals: Reviewed and per EMR flowsheets.        Anesthesia Post Evaluation    Patient location during evaluation: PACU  Patient participation: complete - patient participated  Level of consciousness: awake and alert  Pain score: 1  Airway patency: patent  Nausea & Vomiting: no nausea and no vomiting  Complications: no  Cardiovascular status: hemodynamically stable  Respiratory status: acceptable  Hydration status: euvolemic

## 2022-06-17 NOTE — OP NOTE
dressed with Xeroform and a well-padded ulnar gutter splint with the hand in intrinsic plus position. Patient was in awoken uneventfully from anesthetic transfer onto the [facility] and to the postanesthesia care in stable condition. Postoperative plans:  Patient be discharged home today as this was scheduled for an outpatient procedure. Is to maintain a splint, elevation, nonweightbearing affected extremity. Patient plans to return to work 6/20/2022, discussed is not to use the left hand and to keep his splint clean dry and intact. He is status back in orthopedic office in 2 weeks for repeat evaluation. Anticipate pin removal in approximately 3-4 weeks. Electronically signed by Nena Gabriel DO on 6/17/2022     NOTE: This report was transcribed using voice recognition software.  Every effort was made to ensure accuracy; however, inadvertent computerized transcription errors may be present

## 2022-06-17 NOTE — LETTER
[address] [facility]  Phone: [phone]    No name on file. June 17, 2022     Patient: Lynae Holstein   YOB: 1970   Date of Visit: 6/14/2022       To Whom It May Concern: It is my medical opinion that Deepti Cuevas may return to work on 6/20/22 with the following restrictions: Patient is not to use the left hand for any lifting. .    If you have any questions or concerns, please don't hesitate to call. Sincerely,        No name on file.

## 2022-06-17 NOTE — PROGRESS NOTES
Dr. Tennille Gunderson notified of Dr. Domi Dietrich notifed by perfect serve he has a script to sign at PACU desk

## 2022-06-17 NOTE — ANESTHESIA PRE PROCEDURE
Department of Anesthesiology  Preprocedure Note       Name:  Dereje Engle   Age:  47 y.o.  :  1970                                          MRN:  [id number]         Date:  2022      Surgeon: Manuel Lara):  Gunner Hamlin DO    Procedure: Procedure(s):  CLOSED REDUCTION PERCUTANOUS PINNING VS OPEN REDUCTION INTERNAL FIXATION OF LEFT HAND 5TH PROXIMAL PHALANX--SYNTHES    Medications prior to admission:   Prior to Admission medications    Medication Sig Start Date End Date Taking? Authorizing Provider   Cholecalciferol (VITAMIN D3) 125 MCG (5000 UT) TABS Take by mouth daily   Yes Historical Provider, MD   aspirin 81 MG chewable tablet Take 81 mg by mouth daily    Historical Provider, MD   valsartan-hydrochlorothiazide (DIOVAN-HCT) 320-12.5 MG per tablet Take 1 tablet by mouth daily    Historical Provider, MD       Current medications:    Current Facility-Administered Medications   Medication Dose Route Frequency Provider Last Rate Last Admin    0.9 % sodium chloride infusion   IntraVENous PRN RAQUEL Pineda 50 mL/hr at 22 0753 New Bag at 22 0753    sodium chloride flush 0.9 % injection 5-40 mL  5-40 mL IntraVENous 2 times per day RAQUEL Pineda        sodium chloride flush 0.9 % injection 5-40 mL  5-40 mL IntraVENous PRN RAQUEL Pineda        vancomycin 1500 mg in dextrose 5% 300 mL IVPB  1,500 mg IntraVENous On Call to [address],  mL/hr at 22 0821 1,500 mg at 22 4118       Allergies: Allergies   Allergen Reactions    Pcn [Penicillins] Swelling       Problem List:  There is no problem list on file for this patient.       Past Medical History:        Diagnosis Date    Hypertension        Past Surgical History:        Procedure Laterality Date    TONSILLECTOMY         Social History:    Social History     Tobacco Use    Smoking status: Current Every Day Smoker     Packs/day: 1.00     Types: Cigarettes    Smokeless tobacco: Never Used   Substance Use Topics    Alcohol use: No                                Ready to quit: Not Answered  Counseling given: Not Answered      Vital Signs (Current):   Vitals:    06/15/22 1514 06/17/22 0723   BP:  (!) 184/98   Pulse:  88   Resp:  16   Temp:  36.9 °C (98.4 °F)   TempSrc:  Temporal   SpO2:  96%   Weight: 195 lb (88.5 kg) 195 lb (88.5 kg)   Height: 5' 10\" (1.778 m) 5' 10\" (1.778 m)                                              BP Readings from Last 3 Encounters:   06/17/22 (!) 184/98   06/06/22 (!) 182/98       NPO Status: Time of last liquid consumption: 2100                        Time of last solid consumption: 2100                        Date of last liquid consumption: 06/16/22                        Date of last solid food consumption: 06/16/22    BMI:   Wt Readings from Last 3 Encounters:   06/17/22 195 lb (88.5 kg)   06/14/22 195 lb (88.5 kg)   06/06/22 195 lb (88.5 kg)     Body mass index is 27.98 kg/m². CBC:   Lab Results   Component Value Date    WBC 13.2 06/15/2015    RBC 5.20 06/15/2015    HGB 14.9 06/15/2015    HCT 45.0 06/15/2015    MCV 86.5 06/15/2015    RDW 13.8 06/15/2015     06/15/2015       CMP:   Lab Results   Component Value Date     06/15/2015    K 3.7 06/15/2015     06/15/2015    CO2 27 06/15/2015    BUN 10 06/15/2015    CREATININE 0.8 06/15/2015    GFRAA >60 06/15/2015    LABGLOM >60 06/15/2015    GLUCOSE 100 06/15/2015    PROT 7.2 06/15/2015    CALCIUM 9.6 06/15/2015    BILITOT 0.3 06/15/2015    ALKPHOS 86 06/15/2015    AST 21 06/15/2015    ALT 20 06/15/2015       POC Tests: No results for input(s): POCGLU, POCNA, POCK, POCCL, POCBUN, POCHEMO, POCHCT in the last 72 hours.     Coags: No results found for: PROTIME, INR, APTT    HCG (If Applicable): No results found for: PREGTESTUR, PREGSERUM, HCG, HCGQUANT     ABGs: No results found for: PHART, PO2ART, LEE1FWQ, BRP4UTE, BEART, [id number]     Type & Screen (If Applicable):  No results found for: LABABO, [facility]    Drug/Infectious Status (If Applicable):  No results found for: HIV, HEPCAB    COVID-19 Screening (If Applicable): No results found for: COVID19        Anesthesia Evaluation  Patient summary reviewed and Nursing notes reviewed no history of anesthetic complications:   Airway: Mallampati: II  TM distance: >3 FB   Neck ROM: full  Mouth opening: > = 3 FB   Dental:      Comment: Poor dentition. Several missing. Pulmonary:Negative Pulmonary ROS breath sounds clear to auscultation  (+) current smoker          Patient smoked on day of surgery. Cardiovascular:Negative CV ROS  Exercise tolerance: good (>4 METS),   (+) hypertension: moderate,         Rhythm: regular  Rate: normal                    Neuro/Psych:   Negative Neuro/Psych ROS              GI/Hepatic/Renal: Neg GI/Hepatic/Renal ROS            Endo/Other:                      ROS comment: Displaced fracture of proximal phalanx of left little finger, initial encounter for closed fracture  Abdominal:             Vascular: negative vascular ROS. Other Findings:           Anesthesia Plan      general     ASA 2       Induction: intravenous. MIPS: Postoperative trial extubation. Anesthetic plan and risks discussed with patient and spouse. Use of blood products discussed with patient and spouse whom. Plan discussed with attending. Post-op pain plan if not by surgeon: continuous peripheral nerve block            Rik Tello RN   6/17/2022        Patient seen and examined, chart reviewed, agree with above findings. Anesthetic plan, risks, benefits, alternatives, and personnel involved discussed with patient. Patient verbalized an understanding and agreed to proceed. NPO status confirmed. Anesthetic plan discussed with care team members and agreed upon.     Reynolds Bosworth, DO   6/17/2022  9:20 AM

## 2022-06-17 NOTE — PROGRESS NOTES
Patient is refusing additional pain medication at this time. B/p is trending high and patient has not taken home hypertensive medication.

## 2022-06-29 DIAGNOSIS — S62.617A DISPLACED FRACTURE OF PROXIMAL PHALANX OF LEFT LITTLE FINGER, INITIAL ENCOUNTER FOR CLOSED FRACTURE: Primary | ICD-10-CM

## 2022-06-29 DIAGNOSIS — T14.8XXA FRACTURE: ICD-10-CM

## 2022-06-30 ENCOUNTER — HOSPITAL ENCOUNTER (OUTPATIENT)
Dept: GENERAL RADIOLOGY | Age: 52
Discharge: HOME | End: 2022-07-02
Payer: COMMERCIAL

## 2022-06-30 ENCOUNTER — OFFICE VISIT (OUTPATIENT)
Dept: ORTHOPEDIC SURGERY | Age: 52
End: 2022-06-30
Payer: COMMERCIAL

## 2022-06-30 DIAGNOSIS — T14.8XXA FRACTURE: ICD-10-CM

## 2022-06-30 DIAGNOSIS — S62.617A DISPLACED FRACTURE OF PROXIMAL PHALANX OF LEFT LITTLE FINGER, INITIAL ENCOUNTER FOR CLOSED FRACTURE: Primary | ICD-10-CM

## 2022-06-30 DIAGNOSIS — S62.617A DISPLACED FRACTURE OF PROXIMAL PHALANX OF LEFT LITTLE FINGER, INITIAL ENCOUNTER FOR CLOSED FRACTURE: ICD-10-CM

## 2022-06-30 PROCEDURE — 99212 OFFICE O/P EST SF 10 MIN: CPT

## 2022-06-30 PROCEDURE — 29125 APPL SHORT ARM SPLINT STATIC: CPT

## 2022-06-30 PROCEDURE — 99024 POSTOP FOLLOW-UP VISIT: CPT | Performed by: ORTHOPAEDIC SURGERY

## 2022-06-30 PROCEDURE — 73130 X-RAY EXAM OF HAND: CPT

## 2022-06-30 NOTE — PATIENT INSTRUCTIONS
Nonweightbearing left upper extremity. Keep ulnar gutter splint clean, dry and intact    May consider OT after his next office visit. Follow-up in 3 weeks for reevaluation, x-rays and possible pin removal.    Call if any questions or concerns.

## 2022-06-30 NOTE — PROGRESS NOTES
Patient presents today for 2wk post op LT hand 5th proximal phalanx perc pin vs ORIF 6/17/22; JVG    He states he has removed his splint a few times since surgery due to it getting wet, he did present today with splint intact. Patient states little pain, however he does experience more pinching. He did in fact return to work on 6/20.

## 2022-06-30 NOTE — PROGRESS NOTES
Chief Complaint   Patient presents with    Post-Op Check     2wk post op LT hand 5th proximal phalanx perc pin vs ORIF 6/17/22; JVG        OP:SURGEON: Dr. Justino Jones DO  DATE OF PROCEDURE: 6-17-22  PROCEDURE: PERCUTANOUS PINNING LEFT HAND 5TH PROXIMAL PHALANX     POD: 2 weeks    Subjective:  Anitra Marrero is following up from the above surgery. He is NWB on left upper extremity. He ambulates with assistive device. Pain to extremity is none and is not taking prescribed pain medication. They denies numbness or tingling to the left upper extremity. Denies calf pain, chest pain, or shortness of breath. Patient is not participating in therapy at this time. He is doing well after surgery. Complains of mild stiffness and swelling in the hand. Review of Systems -  All pertinent positives/negatives per HPI     Objective:    General: Alert and oriented X 3, normocephalic atraumatic, external ears and eye normal, sclera clear, no acute distress, respirations easy and unlabored with no audible wheezes, skin warm and dry, speech and dress appropriate for noted age, affect euthymic. Extremity:  Left Upper Extremity  Skin is clean dry and intact   Pin sites are without drainage or erythema. mild edema noted to the ulnar aspect of the hand  2+ Radial pulse, fingers warm with BCR  Flex/extension intact to wrist, thumb and fingers   Finger opposition intact  Finger adduction/abduction intact  Finger crossover intact  unable to make concentric fist  Subjectively states sensation is intact to light touch over the Median Nerve, Ulnar Nerve and Radial Nerve distribution    There were no vitals taken for this visit. XR:   3 views left hand demonstrate satisfactory K wire alignment proximal phalanx left fifth digit fracture. No new abnormality compared to prior x-rays. Assessment:   Diagnosis Orders   1.  Displaced fracture of proximal phalanx of left little finger, subsequent encounter for closed fracture Plan:  X-rays reviewed and discussed. Nonweightbearing left upper extremity. Patient was placed into an ulnar gutter splint. Keep splint clean, dry and intact    May consider OT after his next office visit. Follow-up in 3 weeks for reevaluation, x-rays and possible pin removal.    Call if any questions or concerns. Ramesh Nair Patient Plan Of Care  New Orders Needing C-9 Authorization: None at this appointment  [employer]/Work Status: Patient not released to return to work at this time and will be reevaluated at next office visit  Patient Progressing: Progressing as expected  Patient candidate for Vocational Rehab at this time: No  Patient determined to be MMI at this visit: No    Electronically signed by RAQUEL Cadena on 6/30/2022 at 1:38 PM  Note: This report was completed using [employer] voiced recognition software. Every effort has been made to ensure accuracy; however, inadvertent computerized transcription errors may be present.

## 2022-07-19 DIAGNOSIS — S62.617A DISPLACED FRACTURE OF PROXIMAL PHALANX OF LEFT LITTLE FINGER, INITIAL ENCOUNTER FOR CLOSED FRACTURE: Primary | ICD-10-CM

## 2022-07-21 ENCOUNTER — HOSPITAL ENCOUNTER (OUTPATIENT)
Dept: GENERAL RADIOLOGY | Age: 52
Discharge: HOME | End: 2022-07-23
Payer: COMMERCIAL

## 2022-07-21 ENCOUNTER — OFFICE VISIT (OUTPATIENT)
Dept: ORTHOPEDIC SURGERY | Age: 52
End: 2022-07-21
Payer: COMMERCIAL

## 2022-07-21 DIAGNOSIS — S62.617A DISPLACED FRACTURE OF PROXIMAL PHALANX OF LEFT LITTLE FINGER, INITIAL ENCOUNTER FOR CLOSED FRACTURE: Primary | ICD-10-CM

## 2022-07-21 DIAGNOSIS — S62.617A DISPLACED FRACTURE OF PROXIMAL PHALANX OF LEFT LITTLE FINGER, INITIAL ENCOUNTER FOR CLOSED FRACTURE: ICD-10-CM

## 2022-07-21 PROCEDURE — 99212 OFFICE O/P EST SF 10 MIN: CPT | Performed by: PHYSICIAN ASSISTANT

## 2022-07-21 PROCEDURE — 73130 X-RAY EXAM OF HAND: CPT

## 2022-07-21 PROCEDURE — 99024 POSTOP FOLLOW-UP VISIT: CPT | Performed by: PHYSICIAN ASSISTANT

## 2022-07-21 NOTE — PROGRESS NOTES
Chief Complaint   Patient presents with    Post-Op Check     Left hand 5th proximal phalanx perc pin 6/17/2022. Patient having pain on the 5th MC pin. OP:SURGEON: Dr. Dian David DO  DATE OF PROCEDURE: 6-17-22  PROCEDURE: PERCUTANOUS PINNING LEFT HAND 5TH PROXIMAL PHALANX    Subjective:  Conchita Lesches is approximately 5 weeks from above surgery. He has been back to work, but states that he has maintained his splint and nonweightbearing status the left upper extremity. Not currently in OT. Here today for repeat x-rays and possible percutaneous pin pulling and discussion of therapy. States that pain to the ulnar aspect of the hand is mild and tolerable and intermittent. No new injuries or any other complaints today. Denies fever, chills, malaise. Review of Systems -  all pertinent positives and negatives in HPI. Objective:    General: Alert and oriented X 3, normocephalic atraumatic, external ears and eye normal, sclera clear, no acute distress, respirations easy and unlabored with no audible wheezes, skin warm and dry, speech and dress appropriate for noted age, affect euthymic. Extremity:  Left Upper Extremity  Skin is clean dry and intact  Mild edema noted ulnar side of hand  Percutaneous pins x2 through 5th digit intact, no pin caps present   No erythema or drainage around pin sites, no warmth or fluctuance   Radial pulse palpable, fingers warm with BCR  Flex/extension intact to wrist, thumb and fingers  Finger opposition intact  Finger adduction/abduction intact  Finger crossover intact  Subjectively states sensation intact to radial/medial/ulnar distribution        XR:   3 views of L hand demonstrating s/p percutaneous pin placement x2 after L 5th digit proximal phalanx fracture. Pins x2 remains intact without interval displacement, loosening, or failure. No significant change in alignment.  No acute fractures or dislocations or any other osseus abnormality identified. Assessment:   Diagnosis Orders   1. Displaced fracture of proximal phalanx of left little finger, initial encounter for closed fracture  [facility]    XR HAND LEFT (MIN 3 VIEWS)          Procedure:    After verbal consent, percutaneous pin sites x2 at the L 5th digit cleaned with iodine. In aseptic fashion, these were both pulled with orthopedic pliers. Very minor bleeding appreciated that was controlled with direct pressure. Bandaids placed over pin sites. Patient tolerated this well. Plan:  Reviewed x-rays with patient today in office   Removed percutaneous pins, see above. Keep dry with bandaids over the next 1-2 days, then okay to shower. No soaking or submerging the hand until skin has fully healed. WB:  Non-weight bearing left upper extremity- use assistive devices if needed  Transitioned to bracing. Can remove for hygiene purposes and during OT or for any OT directed home exercises. Therapy: OT referral placed today  Multimodal pain control, RICE       Future Appointments   Date Time Provider Marybel Roberts   8/18/2022  2:30 PM Adam Hinton DO [facility]         Electronically signed by Priscilla Aguiar PA-C on 7/22/2022 at 9:08 AM  Note: This report was completed using [employer] voiced recognition software. Every effort has been made to ensure accuracy; however, inadvertent computerized transcription errors may be present.

## 2022-08-18 ENCOUNTER — OFFICE VISIT (OUTPATIENT)
Dept: ORTHOPEDIC SURGERY | Age: 52
End: 2022-08-18
Payer: COMMERCIAL

## 2022-08-18 ENCOUNTER — HOSPITAL ENCOUNTER (OUTPATIENT)
Dept: GENERAL RADIOLOGY | Age: 52
Discharge: HOME | End: 2022-08-20
Payer: COMMERCIAL

## 2022-08-18 DIAGNOSIS — S62.617D DISPLACED FRACTURE OF PROXIMAL PHALANX OF LEFT LITTLE FINGER, SUBSEQUENT ENCOUNTER FOR FRACTURE WITH ROUTINE HEALING: Primary | ICD-10-CM

## 2022-08-18 DIAGNOSIS — S62.617A DISPLACED FRACTURE OF PROXIMAL PHALANX OF LEFT LITTLE FINGER, INITIAL ENCOUNTER FOR CLOSED FRACTURE: ICD-10-CM

## 2022-08-18 PROCEDURE — 99024 POSTOP FOLLOW-UP VISIT: CPT | Performed by: PHYSICIAN ASSISTANT

## 2022-08-18 PROCEDURE — 99212 OFFICE O/P EST SF 10 MIN: CPT

## 2022-08-18 PROCEDURE — 73130 X-RAY EXAM OF HAND: CPT

## 2022-08-18 RX ORDER — PHENOL 1.4 %
1 AEROSOL, SPRAY (ML) MUCOUS MEMBRANE DAILY
Qty: 30 TABLET | Refills: 3 | Status: SHIPPED | OUTPATIENT
Start: 2022-08-18

## 2022-08-18 RX ORDER — ERGOCALCIFEROL 1.25 MG/1
50000 CAPSULE ORAL WEEKLY
Qty: 12 CAPSULE | Refills: 1 | Status: SHIPPED | OUTPATIENT
Start: 2022-08-18

## 2022-08-18 NOTE — PROGRESS NOTES
Patient presents today for 9 wk post op LT hand 5th proximal phalanx perc pin vs ORIF 6/17/22; JVG    Patient states he is doing well, little pain.

## 2022-08-18 NOTE — PATIENT INSTRUCTIONS
Left hand x-rays taken today showing fifth digit, proximal phalanx fracture still apparent with some interval healing appreciated. Fracture is not healed. Patient is able to continue working, but I do recommend either juliana taping the fifth digit to the fourth digit or wearing ulnar gutter brace that was previously given to the patient at last office visit. Recommend light/partial weightbearing through the left hand only if tolerable. While sedentary/nonweightbearing, can remove brace/juliana tape to continue work on range of motion. Calcium and vitamin D supplementation sent to pharmacy to aid in healing. Nicotine cessation discussed. Will see patient in the next 4 to 6 weeks with a new left hand x-ray. ORTHOPEDIC TRAUMA TEAM STRONGLY RECOMMENDS THAT YOU STOP ALL NICOTINE USE  Nicotine severely impairs your body's ability to heal surgical and traumatic wounds but also impairs bone healing. Wounds and bone heal by forming microscopic blood vessels (angiogenesis) and nicotine is a vasoconstrictor (essentially, shrinks blood vessels). Therefore, if vasoconstriction occurs to these microscopic blood vessels they essentially disappear and are unable to deliver necessary nutrients to the healing tissue. Nicotine increases the chance of your bone not healing and possibly needing further surgery. Nicotine increases risk of infection. Nicotine causes poor wound healing and delayed wound healing. Quitting smoking or nicotine use is something that you can do to dramatically increase your chances of healing. Studies have shown patients who quit smoking have improved outcomes following orthopaedic surgery. This includes all forms of nicotine (smoking, vaping, patches, chewing tobacco, etc.)     After Fracture Nutrition Recommendations:  After a fracture, your bone needs to rebuild. A healthy, well-balanced diet rich in key nutrients can help speed that up.   You don't need to take tasty, fresh fruits and veggies. Aged or heated produce can lose some of its vitamin C, so go for fresh or frozen. Good sources: Citrus fruits like oranges, kiwi fruit, berries, tomatoes, peppers, potatoes, and green vegetables. Iron  If you have iron-deficiency anemia -- when you don't have enough healthy red blood cells -- you may heal more slowly after a fracture. Iron helps your body make collagen to rebuild bone. It also plays a part in getting oxygen into your bones to help them heal.    Good sources: Red meat, dark-meat chicken or turkey, oily fish, eggs, dried fruits, leafy green veggies, whole-grain breads, and fortified cereals. Potassium  Get enough of this mineral in your diet, and you won't lose as much calcium when you pee. There are lots of fresh fruits rich in potassium. Good sources: Bananas, orange juice, potatoes, nuts, seeds, fish, meat, and milk. What Not to Eat  It's a good idea to cut back on or skip these:    Alcohol: While you don't have to cut out alcoholic drinks, these beverages slow down bone healing. You won't build new bone as fast to fix the fracture. A bit too much alcohol can also make you unsteady on your feet, which can make you more likely to fall and risk an injury to the same bone. Salt: Too much of this in your diet can make you lose more calcium in your urine. Salt can be in some foods or drinks that don't taste salty, so check labels and aim for about 1 teaspoon, or 6 grams, a day. Coffee: Lots of caffeine -- more than four cups of strong coffee a day -- can slow down bone healing a little. It might make you pee more, and that could mean you lose more calcium through your urine. A moderate amount of coffee or tea should be fine.

## 2022-08-18 NOTE — PROGRESS NOTES
Chief Complaint   Patient presents with    Follow-up     9 wk post op LT hand 5th proximal phalanx perc pin vs ORIF 6/17/22; JVG       OP:SURGEON: Dr. Abi Layton DO  DATE OF PROCEDURE: 6-17-22  PROCEDURE: PERCUTANOUS PINNING LEFT HAND 5TH PROXIMAL PHALANX    Subjective:  Chano Galvez is approximately 2 months follow-up from the above surgery. Patient states his ulnar gutter brace \"lasted for about a day and a half\" because he states that it is uncomfortable. Presents today without any immobilization. He has continue to work full-time and has remained mostly partial weightbearing to the left hand. This is a Worker's Compensation injury. Pain is minimal to none at the left fifth finger. Does not report any paresthesias. States that his range of motion is mostly back to baseline previous to his injury. He is not in occupational therapy. He does smoke daily - smokes a pipe. Review of Systems -  all pertinent positives and negatives in HPI. Objective:    General: Alert and oriented X 3, normocephalic atraumatic, external ears and eye normal, sclera clear, no acute distress, respirations easy and unlabored with no audible wheezes, skin warm and dry, speech and dress appropriate for noted age, affect euthymic.     Left Upper Extremity  Skin is clean dry and intact  No edema noted  Radial pulse palpable, fingers warm with BCR  Flex/extension intact to wrist, thumb and fingers  Finger opposition intact  Finger adduction/abduction intact  Finger crossover intact  Subjectively states sensation intact to radial/medial/ulnar distribution  Perc pin sites have healed  Nontender to palpation of the 5th digit  Lacking full flexion of the 5th digit similar to his other fingers to the L hand and also matching the contralateral hand - pt states he is at baseline previous to injury      XR:   3 views of L hand demonstrating some interval healing of proximal phalanx of 5th finger, but fracture lucency still apparent. No significant change in alignment. No acute fractures or dislocations or any other osseus abnormality identified. Assessment:   Diagnosis Orders   1. Displaced fracture of proximal phalanx of left little finger, subsequent encounter for fracture with routine healing  vitamin D (ERGOCALCIFEROL) 1.25 MG (21698 UT) CAPS capsule    calcium carbonate (CALCIUM 600) 600 MG TABS tablet    XR HAND LEFT (MIN 3 VIEWS)          Plan:  Reviewed x-rays with patient today in office   Minimal WB through the L hand. Patient noncompliant with ulnar gutter brace given to him at last OV. Recommend at least juliana taping the 5th digit while working/during weight bearing activity, which I would still recommend to be light at this point until more healing is seen on XR  When sedentary/nonweightbearing activity, come out of brace/juliana tape to continue working on ROM  Calcium and vitamin D supplementation sent to pharmacy. Adequate protein intake discussed  Nicotine cessation discussed    Follow up in 4-6 wks with XR of the L hand    [facility] Patient Plan Of Care  New Orders Needing C-9 Authorization: None at this appointment  [employer]/Work Status: same as previous. Patient has remained working. Patient Progressing: Progressing as expected  Patient candidate for Vocational Rehab at this time: No  Patient determined to be MMI at this visit: No      Electronically signed by Cathleen Alexander PA-C on 8/18/2022 at 3:19 PM  Note: This report was completed using [employer] voiced recognition software. Every effort has been made to ensure accuracy; however, inadvertent computerized transcription errors may be present.

## 2022-09-29 ENCOUNTER — OFFICE VISIT (OUTPATIENT)
Dept: ORTHOPEDIC SURGERY | Age: 52
End: 2022-09-29
Payer: COMMERCIAL

## 2022-09-29 ENCOUNTER — HOSPITAL ENCOUNTER (OUTPATIENT)
Dept: GENERAL RADIOLOGY | Age: 52
Discharge: HOME | End: 2022-10-01
Payer: COMMERCIAL

## 2022-09-29 VITALS — BODY MASS INDEX: 27.92 KG/M2 | RESPIRATION RATE: 20 BRPM | WEIGHT: 195 LBS | HEIGHT: 70 IN

## 2022-09-29 DIAGNOSIS — S62.617D DISPLACED FRACTURE OF PROXIMAL PHALANX OF LEFT LITTLE FINGER, SUBSEQUENT ENCOUNTER FOR FRACTURE WITH ROUTINE HEALING: ICD-10-CM

## 2022-09-29 DIAGNOSIS — S61.217A LACERATION OF LEFT LITTLE FINGER WITHOUT FOREIGN BODY WITHOUT DAMAGE TO NAIL, INITIAL ENCOUNTER: Primary | ICD-10-CM

## 2022-09-29 PROCEDURE — 99213 OFFICE O/P EST LOW 20 MIN: CPT | Performed by: PHYSICIAN ASSISTANT

## 2022-09-29 PROCEDURE — 73130 X-RAY EXAM OF HAND: CPT

## 2022-09-29 RX ORDER — DOXYCYCLINE HYCLATE 100 MG
100 TABLET ORAL 2 TIMES DAILY
Qty: 14 TABLET | Refills: 0 | Status: SHIPPED | OUTPATIENT
Start: 2022-09-29 | End: 2022-10-06

## 2022-09-29 NOTE — PROGRESS NOTES
Chief Complaint   Patient presents with    Follow Up After Procedure     Almost 4 months out left small finger proximal phalanx CRPP       OP:SURGEON: Dr. Dov Muñoz DO  DATE OF PROCEDURE: 6-17-22  PROCEDURE: PERCUTANOUS PINNING LEFT HAND 5TH PROXIMAL PHALANX    Subjective:  Odell Arzate is approximately 3.5 months from the above date of surgery. He has continued working and has been weightbearing as tolerated to the left hand. States that about 4 days ago he fell when walking in the woods and has suffered a laceration to the left fifth finger, but did not seek medical attention. The laceration is on the same finger that was operate on and covered by [facility]. Denies any current drainage, fever, chills, fluctuance. He is refusing to go to the emergency department today. I explained that his laceration could still be sutured for best possible outcome and still he refused. He has been juliana taping the fourth and fifth fingers together while working. This is a workers comp injury. He continues to use nicotine. Review of Systems -  all pertinent positives and negatives in HPI. Objective:    General: Alert and oriented X 3, normocephalic atraumatic, external ears and eye normal, sclera clear, no acute distress, respirations easy and unlabored with no audible wheezes, skin warm and dry, speech and dress appropriate for noted age, affect euthymic. Extremity:  Left Upper Extremity  1.5cm superficial laceration to the palmar aspect of the tip of the 5th finger without damage to the nail and no obviously visible foreign body. No drainage, mild subjective numbness at the tip of the finger. No erythema. Mild edema.  Cap refill still brisk  Mild TTP at the proximal phalanx of the 5th digit to palpation   Radial pulse palpable, fingers warm with BCR  Flex/extension intact to wrist, thumb and fingers  Finger opposition intact  Finger adduction/abduction intact  Finger crossover intact  Subjectively states sensation intact to radial/medial/ulnar distribution      Resp 20   Ht 5' 10\" (1.778 m)   Wt 195 lb (88.5 kg)   BMI 27.98 kg/m²     XR:   3 views of L hand demonstrating some interval healing of the proximal phalanx fracture of the 5th digit, but does not appear completely healed at this time. No significant change in alignment. No acute fractures or dislocations or any other osseus abnormality identified. Assessment:   Diagnosis Orders   1. Laceration of left little finger without foreign body without damage to nail, initial encounter  doxycycline hyclate (VIBRA-TABS) 100 MG tablet      2. Displaced fracture of proximal phalanx of left little finger, subsequent encounter for fracture with routine healing  XR HAND LEFT (MIN 3 VIEWS)          Plan:  Reviewed x-rays with patient today in office   Can continue WBAT L hand, but would recommend light, non-strenuous activity with that hand  Pt continues to work - can remain working, still recommend juliana taping the 5th and 4th digits with any WB activity   After multiple attempts, patient still refusing to go to the ED for sutures of his recent finger laceration. Irrigated in the office today and prescribed doxycycline for prophylaxis. Non-adherent dressings applied. Signs and symptoms of infx reviewed. Anticipatory guidance given regarding sooner follow up in urgent care/ED. Patient verbalized understanding. Nicotine cessation again discussed     Follow up in 1-2 months with XR of the L hand    [facility] Patient Plan Of Care  New Orders Needing C-9 Authorization: None at this appointment  [employer]/Work Status: same as previous. Patient has remained working. Patient Progressing: Progressing as expected  Patient candidate for Vocational Rehab at this time: No  Patient determined to be MMI at this visit: No    Electronically signed by Grace Kulkarni PA-C on 9/29/2022 at 3:18 PM  Note: This report was completed using [employer] voiced recognition software. Every effort has been made to ensure accuracy; however, inadvertent computerized transcription errors may be present.

## 2022-11-10 ENCOUNTER — HOSPITAL ENCOUNTER (OUTPATIENT)
Dept: GENERAL RADIOLOGY | Age: 52
Discharge: HOME | End: 2022-11-12
Payer: COMMERCIAL

## 2022-11-10 ENCOUNTER — OFFICE VISIT (OUTPATIENT)
Dept: ORTHOPEDIC SURGERY | Age: 52
End: 2022-11-10
Payer: COMMERCIAL

## 2022-11-10 VITALS — WEIGHT: 200 LBS | BODY MASS INDEX: 28 KG/M2 | HEIGHT: 71 IN

## 2022-11-10 DIAGNOSIS — S62.617D DISPLACED FRACTURE OF PROXIMAL PHALANX OF LEFT LITTLE FINGER, SUBSEQUENT ENCOUNTER FOR FRACTURE WITH ROUTINE HEALING: ICD-10-CM

## 2022-11-10 DIAGNOSIS — S62.617D DISPLACED FRACTURE OF PROXIMAL PHALANX OF LEFT LITTLE FINGER, SUBSEQUENT ENCOUNTER FOR FRACTURE WITH ROUTINE HEALING: Primary | ICD-10-CM

## 2022-11-10 PROCEDURE — 99212 OFFICE O/P EST SF 10 MIN: CPT | Performed by: PHYSICIAN ASSISTANT

## 2022-11-10 PROCEDURE — 73130 X-RAY EXAM OF HAND: CPT

## 2022-11-18 NOTE — PROGRESS NOTES
Chief Complaint   Patient presents with    Follow-up     Lt hand laceration of little finger. BWC Doing well has good ROM, color is good. No pain . OP:SURGEON: Dr. Joe Montero DO  DATE OF PROCEDURE: 6-17-22  PROCEDURE: PERCUTANOUS PINNING LEFT HAND 5TH PROXIMAL PHALANX    Subjective:  Kianna Boyd is approximately 35 months months from the above date of surgery. He has continued working and has been weightbearing as tolerated to the left hand. He states he has no pain to the left hand today. States he is doing well and able to complete all ADLs without restriction. States that left hand laceration fully healed. He has been juliana taping the fourth and fifth fingers together while working. This is a workers comp injury. He continues to use nicotine. Review of Systems -  all pertinent positives and negatives in HPI. Objective:    General: Alert and oriented X 3, normocephalic atraumatic, external ears and eye normal, sclera clear, no acute distress, respirations easy and unlabored with no audible wheezes, skin warm and dry, speech and dress appropriate for noted age, affect euthymic.     Extremity:  Left Upper Extremity  Healed laceration on the palmar aspect of the tip of the 5th finger  No  TTP at the proximal phalanx of the 5th digit to palpation   Radial pulse palpable, fingers warm with BCR  Flex/extension intact to wrist, thumb and fingers  Patient able to make a composite fist  Finger opposition intact  Finger adduction/abduction intact  Finger crossover intact  Subjectively states sensation intact to radial/medial/ulnar distribution      Ht 5' 11\" (1.803 m)   Wt 200 lb (90.7 kg)   BMI 27.89 kg/m²     XR:   EXAMINATION:   THREE XRAY VIEWS OF THE LEFT HAND       11/10/2022 1:22 pm       COMPARISON:   29 September 2022       HISTORY:   ORDERING SYSTEM PROVIDED HISTORY: Displaced fracture of proximal phalanx of   left little finger, subsequent encounter for fracture with routine healing TECHNOLOGIST PROVIDED HISTORY:   What reading provider will be dictating this exam?->CRC       FINDINGS:   Subtle progression of fracture healing at the proximal phalanx of the 5th   finger. 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 No new abnormal   bone or soft tissue findings. Impression   Subtle progression of fracture healing at the proximal phalanx of the 5th   finger. Multi centric osteoarthritis as before. Assessment:   Diagnosis Orders   1. Displaced fracture of proximal phalanx of left little finger, subsequent encounter for fracture with routine healing [S62.617D (ICD-10-CM)]            Plan:  Reviewed x-rays with patient today in office   Can continue WBAT L hand, progress back to activity as tolerated  Patient has no complaints on exam today, has been working full duty since injury  Will release patient to return full duty without restrictions. Patient may follow up as needed    [facility] Patient Plan Of Care  New Orders Needing C-9 Authorization: None at this appointment  [employer]/Work Status: same as previous. Patient has remained working. Patient Progressing: Progressing as expected  Patient candidate for Vocational Rehab at this time: No  Patient determined to be MMI at this visit: No    Electronically signed by Stefanie Valencia PA-C on 11/18/2022 at 8:54 AM  Note: This report was completed using [employer] voiced recognition software. Every effort has been made to ensure accuracy; however, inadvertent computerized transcription errors may be present.

## 2024-02-16 ENCOUNTER — HOSPITAL ENCOUNTER (OUTPATIENT)
Age: 54
Discharge: HOME | End: 2024-02-16
Payer: COMMERCIAL

## 2024-02-16 ENCOUNTER — HOSPITAL ENCOUNTER (OUTPATIENT)
Dept: GENERAL RADIOLOGY | Age: 54
End: 2024-02-16
Payer: COMMERCIAL

## 2024-02-16 DIAGNOSIS — M79.641 BILATERAL HAND PAIN: ICD-10-CM

## 2024-02-16 DIAGNOSIS — M79.642 BILATERAL HAND PAIN: ICD-10-CM

## 2024-02-16 PROCEDURE — 73130 X-RAY EXAM OF HAND: CPT

## 2025-03-19 ENCOUNTER — HOSPITAL ENCOUNTER (OUTPATIENT)
Dept: ULTRASOUND IMAGING | Age: 55
Discharge: HOME | End: 2025-03-21
Payer: COMMERCIAL

## 2025-03-19 ENCOUNTER — HOSPITAL ENCOUNTER (OUTPATIENT)
Dept: CARDIOLOGY | Age: 55
Discharge: HOME | End: 2025-03-21
Payer: COMMERCIAL

## 2025-03-19 VITALS
HEIGHT: 71 IN | DIASTOLIC BLOOD PRESSURE: 92 MMHG | WEIGHT: 200 LBS | BODY MASS INDEX: 28 KG/M2 | SYSTOLIC BLOOD PRESSURE: 158 MMHG

## 2025-03-19 DIAGNOSIS — I10 HYPERTENSION, ESSENTIAL: ICD-10-CM

## 2025-03-19 DIAGNOSIS — R94.4 NONSPECIFIC ABNORMAL RESULTS OF KIDNEY FUNCTION STUDY: ICD-10-CM

## 2025-03-19 DIAGNOSIS — I10 ESSENTIAL HYPERTENSION: ICD-10-CM

## 2025-03-19 PROCEDURE — 93975 VASCULAR STUDY: CPT

## 2025-03-19 PROCEDURE — 93306 TTE W/DOPPLER COMPLETE: CPT

## 2025-03-19 PROCEDURE — 76770 US EXAM ABDO BACK WALL COMP: CPT

## 2025-03-20 LAB
ECHO AO ASC DIAM: 3.6 CM
ECHO AO ASCENDING AORTA INDEX: 1.71 CM/M2
ECHO AV AREA PEAK VELOCITY: 3.5 CM2
ECHO AV AREA VTI: 3.3 CM2
ECHO AV AREA/BSA PEAK VELOCITY: 1.7 CM2/M2
ECHO AV AREA/BSA VTI: 1.6 CM2/M2
ECHO AV CUSP MM: 2.6 CM
ECHO AV MEAN GRADIENT: 2 MMHG
ECHO AV MEAN VELOCITY: 0.7 M/S
ECHO AV PEAK GRADIENT: 5 MMHG
ECHO AV PEAK VELOCITY: 1.1 M/S
ECHO AV VELOCITY RATIO: 0.91
ECHO AV VTI: 26 CM
ECHO BSA: 2.13 M2
ECHO EST RA PRESSURE: 3 MMHG
ECHO IVC PROX: 1.8 CM
ECHO LA DIAMETER INDEX: 2.09 CM/M2
ECHO LA DIAMETER: 4.4 CM
ECHO LA VOL A-L A2C: 83 ML (ref 18–58)
ECHO LA VOL A-L A4C: 71 ML (ref 18–58)
ECHO LA VOL MOD A2C: 79 ML (ref 18–58)
ECHO LA VOL MOD A4C: 67 ML (ref 18–58)
ECHO LA VOLUME AREA LENGTH: 80 ML
ECHO LA VOLUME INDEX A-L A2C: 39 ML/M2 (ref 16–34)
ECHO LA VOLUME INDEX A-L A4C: 34 ML/M2 (ref 16–34)
ECHO LA VOLUME INDEX AREA LENGTH: 38 ML/M2 (ref 16–34)
ECHO LA VOLUME INDEX MOD A2C: 37 ML/M2 (ref 16–34)
ECHO LA VOLUME INDEX MOD A4C: 32 ML/M2 (ref 16–34)
ECHO LV E' LATERAL VELOCITY: 0.06 CM/S
ECHO LV E' SEPTAL VELOCITY: 0.06 CM/S
ECHO LV EF PHYSICIAN: 52 %
ECHO LV FRACTIONAL SHORTENING: 28 % (ref 28–44)
ECHO LV INTERNAL DIMENSION DIASTOLE INDEX: 2.51 CM/M2
ECHO LV INTERNAL DIMENSION DIASTOLIC: 5.3 CM (ref 4.2–5.9)
ECHO LV INTERNAL DIMENSION SYSTOLIC INDEX: 1.8 CM/M2
ECHO LV INTERNAL DIMENSION SYSTOLIC: 3.8 CM
ECHO LV ISOVOLUMETRIC RELAXATION TIME (IVRT): 101.5 MS
ECHO LV IVSD: 1.3 CM (ref 0.6–1)
ECHO LV IVSS: 1.3 CM
ECHO LV MASS 2D: 271.6 G (ref 88–224)
ECHO LV MASS INDEX 2D: 128.7 G/M2 (ref 49–115)
ECHO LV POSTERIOR WALL DIASTOLIC: 1.2 CM (ref 0.6–1)
ECHO LV POSTERIOR WALL SYSTOLIC: 2 CM
ECHO LV RELATIVE WALL THICKNESS RATIO: 0.45
ECHO LVOT AREA: 3.8 CM2
ECHO LVOT AV VTI INDEX: 0.89
ECHO LVOT DIAM: 2.2 CM
ECHO LVOT MEAN GRADIENT: 2 MMHG
ECHO LVOT PEAK GRADIENT: 4 MMHG
ECHO LVOT PEAK VELOCITY: 1 M/S
ECHO LVOT STROKE VOLUME INDEX: 41.6 ML/M2
ECHO LVOT SV: 87.8 ML
ECHO LVOT VTI: 23.1 CM
ECHO MV "A" WAVE DURATION: 133.8 MSEC
ECHO MV A VELOCITY: 0.66 M/S
ECHO MV AREA PHT: 1.3 CM2
ECHO MV AREA VTI: 3.1 CM2
ECHO MV E DECELERATION TIME (DT): 255.3 MS
ECHO MV E VELOCITY: 0.7 M/S
ECHO MV E/A RATIO: 1.06
ECHO MV E/E' LATERAL: 1166.67
ECHO MV E/E' RATIO (AVERAGED): 1166.67
ECHO MV E/E' SEPTAL: 1166.67
ECHO MV LVOT VTI INDEX: 1.22
ECHO MV MAX VELOCITY: 0.9 M/S
ECHO MV MEAN GRADIENT: 1 MMHG
ECHO MV MEAN VELOCITY: 0.5 M/S
ECHO MV PEAK GRADIENT: 3 MMHG
ECHO MV PRESSURE HALF TIME (PHT): 175.2 MS
ECHO MV VTI: 28.2 CM
ECHO PV MAX VELOCITY: 0.9 M/S
ECHO PV MEAN GRADIENT: 1 MMHG
ECHO PV MEAN VELOCITY: 0.5 M/S
ECHO PV PEAK GRADIENT: 3 MMHG
ECHO PV VTI: 20.6 CM
ECHO PVEIN A DURATION: 147.6 MS
ECHO PVEIN A VELOCITY: 0.3 M/S
ECHO PVEIN PEAK D VELOCITY: 0.4 M/S
ECHO PVEIN PEAK S VELOCITY: 0.5 M/S
ECHO PVEIN S/D RATIO: 1.3
ECHO RIGHT VENTRICULAR SYSTOLIC PRESSURE (RVSP): 35 MMHG
ECHO RV BASAL DIMENSION: 4 CM
ECHO RV INTERNAL DIMENSION: 4.1 CM
ECHO RV LONGITUDINAL DIMENSION: 8.2 CM
ECHO RV MID DIMENSION: 3.3 CM
ECHO RV TAPSE: 2.6 CM (ref 1.7–?)
ECHO TV REGURGITANT MAX VELOCITY: 2.85 M/S
ECHO TV REGURGITANT PEAK GRADIENT: 32 MMHG

## 2025-04-21 ENCOUNTER — TRANSCRIBE ORDERS (OUTPATIENT)
Dept: ADMINISTRATIVE | Age: 55
End: 2025-04-21

## 2025-04-21 DIAGNOSIS — N40.1 BENIGN PROSTATIC HYPERPLASIA WITH URINARY OBSTRUCTION: ICD-10-CM

## 2025-04-21 DIAGNOSIS — R33.8 OTHER RETENTION OF URINE: ICD-10-CM

## 2025-04-21 DIAGNOSIS — N20.2 CALCULUS OF KIDNEY AND URETER: Primary | ICD-10-CM

## 2025-04-21 DIAGNOSIS — N13.8 BENIGN PROSTATIC HYPERPLASIA WITH URINARY OBSTRUCTION: ICD-10-CM

## (undated) DEVICE — PADDING,UNDERCAST,COTTON, 3X4YD STERILE: Brand: MEDLINE

## (undated) DEVICE — UPPER EXTREMITY: Brand: MEDLINE INDUSTRIES, INC.

## (undated) DEVICE — PADDING,UNDERCAST,COTTON, 4"X4YD STERILE: Brand: MEDLINE

## (undated) DEVICE — BNDG,ELSTC,MATRIX,STRL,3"X5YD,LF,HOOK&LP: Brand: MEDLINE

## (undated) DEVICE — GLOVE ORANGE PI 8   MSG9080

## (undated) DEVICE — GOWN,BREATHABLE SLV,AURORA,XLG,STRL: Brand: MEDLINE

## (undated) DEVICE — DRAPE,HAND,STERILE: Brand: MEDLINE

## (undated) DEVICE — ZIMMER® STERILE DISPOSABLE TOURNIQUET CUFF WITH PROTECTIVE SLEEVE AND PLC, DUAL PORT, SINGLE BLADDER, 18 IN. (46 CM)

## (undated) DEVICE — GLOVE ORTHO 8   MSG9480